# Patient Record
Sex: FEMALE | Race: WHITE | NOT HISPANIC OR LATINO | Employment: OTHER | URBAN - METROPOLITAN AREA
[De-identification: names, ages, dates, MRNs, and addresses within clinical notes are randomized per-mention and may not be internally consistent; named-entity substitution may affect disease eponyms.]

---

## 2020-09-09 LAB — CRC RECOMMENDATION EXT: NORMAL

## 2020-11-03 ENCOUNTER — HISTORICAL (OUTPATIENT)
Dept: ADMINISTRATIVE | Facility: HOSPITAL | Age: 60
End: 2020-11-03

## 2020-11-03 LAB
ABS NEUT (OLG): 3.83 X10(3)/MCL (ref 2.1–9.2)
ALBUMIN SERPL-MCNC: 4.1 GM/DL (ref 3.4–4.8)
ALBUMIN/GLOB SERPL: 1.4 RATIO (ref 1.1–2)
ALP SERPL-CCNC: 183 UNIT/L (ref 40–150)
ALT SERPL-CCNC: 24 UNIT/L (ref 0–55)
AST SERPL-CCNC: 21 UNIT/L (ref 5–34)
BASOPHILS # BLD AUTO: 0.1 X10(3)/MCL (ref 0–0.2)
BASOPHILS NFR BLD AUTO: 1 %
BILIRUB SERPL-MCNC: 0.5 MG/DL
BILIRUBIN DIRECT+TOT PNL SERPL-MCNC: 0.2 MG/DL (ref 0–0.5)
BILIRUBIN DIRECT+TOT PNL SERPL-MCNC: 0.3 MG/DL (ref 0–0.8)
BUN SERPL-MCNC: 8.9 MG/DL (ref 9.8–20.1)
CALCIUM SERPL-MCNC: 9.1 MG/DL (ref 8.4–10.2)
CHLORIDE SERPL-SCNC: 106 MMOL/L (ref 98–107)
CHOLEST SERPL-MCNC: 209 MG/DL
CHOLEST/HDLC SERPL: 3 {RATIO} (ref 0–5)
CO2 SERPL-SCNC: 28 MMOL/L (ref 23–31)
CREAT SERPL-MCNC: 0.83 MG/DL (ref 0.55–1.02)
EOSINOPHIL # BLD AUTO: 0.1 X10(3)/MCL (ref 0–0.9)
EOSINOPHIL NFR BLD AUTO: 2 %
ERYTHROCYTE [DISTWIDTH] IN BLOOD BY AUTOMATED COUNT: 13.3 % (ref 11.5–17)
GLOBULIN SER-MCNC: 3 GM/DL (ref 2.4–3.5)
GLUCOSE SERPL-MCNC: 97 MG/DL (ref 82–115)
HCT VFR BLD AUTO: 42.6 % (ref 37–47)
HDLC SERPL-MCNC: 60 MG/DL (ref 35–60)
HGB BLD-MCNC: 13.3 GM/DL (ref 12–16)
LDLC SERPL CALC-MCNC: 118 MG/DL (ref 50–140)
LYMPHOCYTES # BLD AUTO: 2.7 X10(3)/MCL (ref 0.6–4.6)
LYMPHOCYTES NFR BLD AUTO: 37 %
MCH RBC QN AUTO: 28.8 PG (ref 27–31)
MCHC RBC AUTO-ENTMCNC: 31.2 GM/DL (ref 33–36)
MCV RBC AUTO: 92.2 FL (ref 80–94)
MONOCYTES # BLD AUTO: 0.5 X10(3)/MCL (ref 0.1–1.3)
MONOCYTES NFR BLD AUTO: 7 %
NEUTROPHILS # BLD AUTO: 3.83 X10(3)/MCL (ref 2.1–9.2)
NEUTROPHILS NFR BLD AUTO: 53 %
PLATELET # BLD AUTO: 326 X10(3)/MCL (ref 130–400)
PMV BLD AUTO: 12.3 FL (ref 9.4–12.4)
POTASSIUM SERPL-SCNC: 4.7 MMOL/L (ref 3.5–5.1)
PROT SERPL-MCNC: 7.1 GM/DL (ref 5.8–7.6)
RBC # BLD AUTO: 4.62 X10(6)/MCL (ref 4.2–5.4)
SODIUM SERPL-SCNC: 145 MMOL/L (ref 136–145)
TRIGL SERPL-MCNC: 155 MG/DL (ref 37–140)
VLDLC SERPL CALC-MCNC: 31 MG/DL
WBC # SPEC AUTO: 7.2 X10(3)/MCL (ref 4.5–11.5)

## 2021-09-16 LAB
BUN SERPL-MCNC: 20 MG/DL (ref 7–18)
CHOLEST SERPL-MCNC: 222 MG/DL
CREAT SERPL-MCNC: 0.94 MG/DL (ref 0.6–1.3)
GLUCOSE SERPL-MCNC: 92 MG/DL (ref 74–106)
HDLC SERPL-MCNC: 53 MG/DL (ref 35–60)
LDLC SERPL CALC-MCNC: 135 MG/DL
TRIGL SERPL-MCNC: 189 MG/DL (ref 30–150)

## 2021-09-28 ENCOUNTER — HISTORICAL (OUTPATIENT)
Dept: RADIOLOGY | Facility: HOSPITAL | Age: 61
End: 2021-09-28

## 2022-09-18 ENCOUNTER — HISTORICAL (OUTPATIENT)
Dept: ADMINISTRATIVE | Facility: HOSPITAL | Age: 62
End: 2022-09-18
Payer: COMMERCIAL

## 2022-10-14 ENCOUNTER — DOCUMENTATION ONLY (OUTPATIENT)
Dept: ADMINISTRATIVE | Facility: HOSPITAL | Age: 62
End: 2022-10-14
Payer: COMMERCIAL

## 2022-10-14 NOTE — PROGRESS NOTES
Population Health Outreach. Records Received. Hyperlinked into chart at this time. The following record(s) below were uploaded for Health Maintenance.       Colonoscopy 9.9.2020

## 2023-03-03 ENCOUNTER — DOCUMENTATION ONLY (OUTPATIENT)
Dept: PRIMARY CARE CLINIC | Facility: CLINIC | Age: 63
End: 2023-03-03

## 2023-03-03 ENCOUNTER — OFFICE VISIT (OUTPATIENT)
Dept: PRIMARY CARE CLINIC | Facility: CLINIC | Age: 63
End: 2023-03-03
Payer: COMMERCIAL

## 2023-03-03 VITALS
OXYGEN SATURATION: 98 % | WEIGHT: 156.19 LBS | SYSTOLIC BLOOD PRESSURE: 138 MMHG | DIASTOLIC BLOOD PRESSURE: 82 MMHG | HEART RATE: 70 BPM

## 2023-03-03 DIAGNOSIS — K50.90 CROHN'S DISEASE WITHOUT COMPLICATION, UNSPECIFIED GASTROINTESTINAL TRACT LOCATION: ICD-10-CM

## 2023-03-03 DIAGNOSIS — R73.09 ELEVATED GLUCOSE: ICD-10-CM

## 2023-03-03 DIAGNOSIS — E87.5 HYPERKALEMIA: ICD-10-CM

## 2023-03-03 DIAGNOSIS — Z00.00 WELLNESS EXAMINATION: ICD-10-CM

## 2023-03-03 DIAGNOSIS — Z12.39 ENCOUNTER FOR SCREENING FOR MALIGNANT NEOPLASM OF BREAST, UNSPECIFIED SCREENING MODALITY: Primary | ICD-10-CM

## 2023-03-03 PROCEDURE — 99396 PR PREVENTIVE VISIT,EST,40-64: ICD-10-PCS | Mod: ,,, | Performed by: FAMILY MEDICINE

## 2023-03-03 PROCEDURE — 1159F PR MEDICATION LIST DOCUMENTED IN MEDICAL RECORD: ICD-10-PCS | Mod: CPTII,,, | Performed by: FAMILY MEDICINE

## 2023-03-03 PROCEDURE — 1160F PR REVIEW ALL MEDS BY PRESCRIBER/CLIN PHARMACIST DOCUMENTED: ICD-10-PCS | Mod: CPTII,,, | Performed by: FAMILY MEDICINE

## 2023-03-03 PROCEDURE — 1159F MED LIST DOCD IN RCRD: CPT | Mod: CPTII,,, | Performed by: FAMILY MEDICINE

## 2023-03-03 PROCEDURE — 3079F DIAST BP 80-89 MM HG: CPT | Mod: CPTII,,, | Performed by: FAMILY MEDICINE

## 2023-03-03 PROCEDURE — 99396 PREV VISIT EST AGE 40-64: CPT | Mod: ,,, | Performed by: FAMILY MEDICINE

## 2023-03-03 PROCEDURE — 3075F SYST BP GE 130 - 139MM HG: CPT | Mod: CPTII,,, | Performed by: FAMILY MEDICINE

## 2023-03-03 PROCEDURE — 1160F RVW MEDS BY RX/DR IN RCRD: CPT | Mod: CPTII,,, | Performed by: FAMILY MEDICINE

## 2023-03-03 PROCEDURE — 3079F PR MOST RECENT DIASTOLIC BLOOD PRESSURE 80-89 MM HG: ICD-10-PCS | Mod: CPTII,,, | Performed by: FAMILY MEDICINE

## 2023-03-03 PROCEDURE — 3075F PR MOST RECENT SYSTOLIC BLOOD PRESS GE 130-139MM HG: ICD-10-PCS | Mod: CPTII,,, | Performed by: FAMILY MEDICINE

## 2023-03-03 RX ORDER — PANTOPRAZOLE SODIUM 40 MG/1
40 TABLET, DELAYED RELEASE ORAL
COMMUNITY
Start: 2023-02-07

## 2023-03-03 RX ORDER — MESALAMINE 400 MG/1
CAPSULE, DELAYED RELEASE ORAL
COMMUNITY
Start: 2022-10-27

## 2023-03-03 RX ORDER — FUROSEMIDE 40 MG/1
40 TABLET ORAL DAILY
Qty: 7 TABLET | Refills: 0 | Status: SHIPPED | OUTPATIENT
Start: 2023-03-03 | End: 2023-03-10

## 2023-03-03 NOTE — PROGRESS NOTES
Chief Complaint  Chief Complaint   Patient presents with    Annual Exam       History of Present Illness  Patient presents for routine annual wellness visit.  Lab work performed prior to this visit includes FLP with LDL of 130 with CBC showing no concerning findings and CMP with elevated potassium at 5.8 with no other abnormalities other than glucose of 101.  Patient denies any signs or symptoms that are concerning at this time and blood pressure within normal limits upon manual recheck.    PMH:  No past medical history on file.     Procedure/Surgical History  Past Surgical History:   Procedure Laterality Date    COLONOSCOPY  09/09/2020    HARSH WAITE MD       Medications  Current Outpatient Medications on File Prior to Visit   Medication Sig Dispense Refill    busPIRone (BUSPAR) 5 MG Tab TAKE 1 TABLET BY MOUTH EVERY MORNING 90 tablet 3    mesalamine (DELZICOL) 400 mg cdti cdti capsule Take by mouth.      metoprolol succinate (TOPROL-XL) 100 MG 24 hr tablet TAKE 1 TABLET BY MOUTH DAILY 90 tablet 3    pantoprazole (PROTONIX) 40 MG tablet Take 40 mg by mouth.       No current facility-administered medications on file prior to visit.       Specialist:  Patient Care Team:  Rory Anderson MD as PCP - General (Family Medicine)     Screening:  Health Maintenance Due   Topic Date Due    Hepatitis C Screening  Never done    COVID-19 Vaccine (1) Never done    HIV Screening  Never done    TETANUS VACCINE  Never done    Cervical Cancer Screening  Never done    Shingles Vaccine (1 of 2) Never done    Influenza Vaccine (1) Never done    Mammogram  09/28/2022        Allegies  Review of patient's allergies indicates:  No Known Allergies     Social History  Social History     Socioeconomic History    Marital status:    Tobacco Use    Smoking status: Every Day     Types: Cigarettes    Smokeless tobacco: Never   Substance and Sexual Activity    Alcohol use: Yes    Drug use: Never    Sexual activity: Yes       Family  History  History reviewed. No pertinent family history.    Review of Systems  Review of Systems   Constitutional:  Negative for chills and fever.   HENT:  Negative for congestion and sore throat.    Eyes:  Negative for redness and visual disturbance.   Respiratory:  Negative for cough and shortness of breath.    Cardiovascular:  Negative for chest pain and palpitations.   Gastrointestinal:  Negative for nausea and vomiting.   Musculoskeletal:  Negative for arthralgias and myalgias.   Skin:  Negative for pallor and rash.   Neurological:  Negative for dizziness and headaches.   Psychiatric/Behavioral:  Negative for agitation and dysphoric mood.      Physical Exam  Physical Exam  Constitutional:       Appearance: Normal appearance.   HENT:      Head: Normocephalic and atraumatic.   Eyes:      General: No scleral icterus.     Conjunctiva/sclera: Conjunctivae normal.   Cardiovascular:      Rate and Rhythm: Normal rate and regular rhythm.   Pulmonary:      Effort: Pulmonary effort is normal.      Breath sounds: Normal breath sounds.   Skin:     General: Skin is warm and dry.   Neurological:      General: No focal deficit present.      Mental Status: She is alert and oriented to person, place, and time.   Psychiatric:         Mood and Affect: Mood normal.         Behavior: Behavior normal.       Immunizations    There is no immunization history on file for this patient.    Health Maintenance  Health Maintenance   Topic Date Due    Hepatitis C Screening  Never done    TETANUS VACCINE  Never done    Mammogram  09/28/2022    Lipid Panel  02/22/2027        Assessment     ICD-10-CM ICD-9-CM   1. Encounter for screening for malignant neoplasm of breast, unspecified screening modality  Z12.39 V76.10   2. Wellness examination  Z00.00 V70.0   3. Elevated glucose  R73.09 790.29   4. Hyperkalemia  E87.5 276.7   5. Crohn's disease without complication, unspecified gastrointestinal tract location  K50.90 555.9        Plan  Problem List  Items Addressed This Visit          Renal/    Hyperkalemia    Overview     Decrease potassium intake in diet and adequate hydration recommended with 1 week course of Lasix as potassium wasting diuretic monitor for any signs or symptoms concerning for symptomatic hyperkalemia.  Discussed possibility of lab error as well however we will treat empirically and consider retesting at earlier date if concerning symptoms arise.            GI    Crohn's disease without complication, unspecified gastrointestinal tract location    Overview     Continue to follow-up with gastroenterology for routine monitoring management            Other    Wellness examination    Overview     Discussed routine annual health maintenance and screening in addition to acute issues noted below.          Other Visit Diagnoses       Encounter for screening for malignant neoplasm of breast, unspecified screening modality    -  Primary    Elevated glucose                Follow up in about 1 year (around 3/3/2024) for wellness, lab review.    Rory Anderson

## 2023-04-03 ENCOUNTER — HOSPITAL ENCOUNTER (OUTPATIENT)
Dept: RADIOLOGY | Facility: HOSPITAL | Age: 63
Discharge: HOME | End: 2023-04-03
Attending: FAMILY MEDICINE
Payer: COMMERCIAL

## 2023-04-03 DIAGNOSIS — Z12.39 ENCOUNTER FOR SCREENING FOR MALIGNANT NEOPLASM OF BREAST, UNSPECIFIED SCREENING MODALITY: ICD-10-CM

## 2023-04-03 PROCEDURE — 77067 SCR MAMMO BI INCL CAD: CPT | Mod: TC

## 2023-04-03 PROCEDURE — 77063 MAMMO DIGITAL SCREENING BILAT WITH TOMO: ICD-10-PCS | Mod: 26,,, | Performed by: RADIOLOGY

## 2023-04-03 PROCEDURE — 77067 MAMMO DIGITAL SCREENING BILAT WITH TOMO: ICD-10-PCS | Mod: 26,,, | Performed by: RADIOLOGY

## 2023-04-03 PROCEDURE — 77063 BREAST TOMOSYNTHESIS BI: CPT | Mod: 26,,, | Performed by: RADIOLOGY

## 2023-04-03 PROCEDURE — 77067 SCR MAMMO BI INCL CAD: CPT | Mod: 26,,, | Performed by: RADIOLOGY

## 2023-04-06 ENCOUNTER — TELEPHONE (OUTPATIENT)
Dept: PRIMARY CARE CLINIC | Facility: CLINIC | Age: 63
End: 2023-04-06
Payer: COMMERCIAL

## 2023-04-06 NOTE — TELEPHONE ENCOUNTER
----- Message from Rory Anderson MD sent at 4/5/2023  6:24 AM CDT -----  Normal MMG. Repeat in 1 year.

## 2023-06-05 PROBLEM — Z00.00 WELLNESS EXAMINATION: Status: RESOLVED | Noted: 2023-03-03 | Resolved: 2023-06-05

## 2023-12-12 DIAGNOSIS — I10 HYPERTENSION, UNSPECIFIED TYPE: ICD-10-CM

## 2023-12-12 RX ORDER — METOPROLOL SUCCINATE 100 MG/1
100 TABLET, EXTENDED RELEASE ORAL DAILY
Qty: 90 TABLET | Refills: 3 | Status: SHIPPED | OUTPATIENT
Start: 2023-12-12

## 2024-01-16 DIAGNOSIS — F41.9 ANXIETY: ICD-10-CM

## 2024-01-16 RX ORDER — BUSPIRONE HYDROCHLORIDE 5 MG/1
5 TABLET ORAL EVERY MORNING
Qty: 90 TABLET | Refills: 3 | Status: SHIPPED | OUTPATIENT
Start: 2024-01-16

## 2024-04-10 ENCOUNTER — TELEPHONE (OUTPATIENT)
Dept: PRIMARY CARE CLINIC | Facility: CLINIC | Age: 64
End: 2024-04-10
Payer: COMMERCIAL

## 2024-04-10 ENCOUNTER — OFFICE VISIT (OUTPATIENT)
Dept: PRIMARY CARE CLINIC | Facility: CLINIC | Age: 64
End: 2024-04-10
Payer: COMMERCIAL

## 2024-04-10 VITALS
SYSTOLIC BLOOD PRESSURE: 156 MMHG | OXYGEN SATURATION: 97 % | DIASTOLIC BLOOD PRESSURE: 80 MMHG | HEIGHT: 62 IN | BODY MASS INDEX: 29.78 KG/M2 | WEIGHT: 161.81 LBS | HEART RATE: 77 BPM

## 2024-04-10 DIAGNOSIS — I10 BENIGN ESSENTIAL HTN: ICD-10-CM

## 2024-04-10 DIAGNOSIS — B02.9 HERPES ZOSTER WITHOUT COMPLICATION: Primary | ICD-10-CM

## 2024-04-10 DIAGNOSIS — Z00.00 WELLNESS EXAMINATION: ICD-10-CM

## 2024-04-10 DIAGNOSIS — K50.90 CROHN'S DISEASE WITHOUT COMPLICATION, UNSPECIFIED GASTROINTESTINAL TRACT LOCATION: ICD-10-CM

## 2024-04-10 PROCEDURE — 3008F BODY MASS INDEX DOCD: CPT | Mod: CPTII,,, | Performed by: STUDENT IN AN ORGANIZED HEALTH CARE EDUCATION/TRAINING PROGRAM

## 2024-04-10 PROCEDURE — 1101F PT FALLS ASSESS-DOCD LE1/YR: CPT | Mod: CPTII,,, | Performed by: STUDENT IN AN ORGANIZED HEALTH CARE EDUCATION/TRAINING PROGRAM

## 2024-04-10 PROCEDURE — 99214 OFFICE O/P EST MOD 30 MIN: CPT | Mod: ,,, | Performed by: STUDENT IN AN ORGANIZED HEALTH CARE EDUCATION/TRAINING PROGRAM

## 2024-04-10 PROCEDURE — 1126F AMNT PAIN NOTED NONE PRSNT: CPT | Mod: CPTII,,, | Performed by: STUDENT IN AN ORGANIZED HEALTH CARE EDUCATION/TRAINING PROGRAM

## 2024-04-10 PROCEDURE — 1160F RVW MEDS BY RX/DR IN RCRD: CPT | Mod: CPTII,,, | Performed by: STUDENT IN AN ORGANIZED HEALTH CARE EDUCATION/TRAINING PROGRAM

## 2024-04-10 PROCEDURE — 3079F DIAST BP 80-89 MM HG: CPT | Mod: CPTII,,, | Performed by: STUDENT IN AN ORGANIZED HEALTH CARE EDUCATION/TRAINING PROGRAM

## 2024-04-10 PROCEDURE — 3077F SYST BP >= 140 MM HG: CPT | Mod: CPTII,,, | Performed by: STUDENT IN AN ORGANIZED HEALTH CARE EDUCATION/TRAINING PROGRAM

## 2024-04-10 PROCEDURE — 1159F MED LIST DOCD IN RCRD: CPT | Mod: CPTII,,, | Performed by: STUDENT IN AN ORGANIZED HEALTH CARE EDUCATION/TRAINING PROGRAM

## 2024-04-10 PROCEDURE — 3288F FALL RISK ASSESSMENT DOCD: CPT | Mod: CPTII,,, | Performed by: STUDENT IN AN ORGANIZED HEALTH CARE EDUCATION/TRAINING PROGRAM

## 2024-04-10 RX ORDER — VALACYCLOVIR HYDROCHLORIDE 1 G/1
1000 TABLET, FILM COATED ORAL 3 TIMES DAILY
Qty: 21 TABLET | Refills: 0 | Status: SHIPPED | OUTPATIENT
Start: 2024-04-10 | End: 2024-04-17

## 2024-04-10 RX ORDER — LISINOPRIL 10 MG/1
10 TABLET ORAL DAILY
Qty: 90 TABLET | Refills: 3 | Status: SHIPPED | OUTPATIENT
Start: 2024-04-10 | End: 2025-04-10

## 2024-04-10 NOTE — ASSESSMENT & PLAN NOTE
Start Valacyclovir 1000 mg q.8 hours x7 days.  Adverse effects discussed (including but not limited to delirium, psychosis, encephalopathy, seizures, GI upset, photosensitivity, decreased platelets, abnormal liver function)  Lidocaine ointment and cortisone cream prn  Contact precautions   ED precautions for worsening symptoms   Shingles vaccine 3 months after clearance if not done already

## 2024-04-10 NOTE — ASSESSMENT & PLAN NOTE
Blood pressure not at goal <140/90 (<130/80 if otherwise noted)  Start Lisinopril 10mg qd. AE discussed  Continue Metoprolol 100mg qd  Record BP at home and call if still greater than 140/90.  Nurse visit in 1 week for BP check  Recommend DASH diet  Avoid tobacco use  Record BP at home daily and bring log to next office visit, assure that home cuff is calibrated at minimum every 12 months

## 2024-04-10 NOTE — PROGRESS NOTES
"Date: 04/10/2024  Patient ID: [id number]   Chief Complaint: Herpes Zoster (Possible shingles, under arm of right side under bra strap)    HPI:   Jacki Rutledge is a 63 y.o. female here today for Herpes Zoster (Possible shingles, under arm of right side under bra strap)  Started 2 d ago with itching on R side and developed 1 "bump" last night that turned into a cluster of "bumps that is painful at times." Did have increased stress for the past 2 wks and is on Mesalamine for Crohns. Did not have Shingles vaccine yet  Also has hair loss, cold intolerance, increased weight, sleeplessness-would like to check TFTs. BP at home 140-150s    Patient Active Problem List   Diagnosis    Hyperkalemia    Crohn's disease without complication, unspecified gastrointestinal tract location    Herpes zoster without complication    Benign essential HTN     Outpatient Medications Marked as Taking for the 4/10/24 encounter (Office Visit) with Darlene Vazquez MD   Medication Sig Dispense Refill    busPIRone (BUSPAR) 5 MG Tab Take 1 tablet (5 mg total) by mouth every morning. 90 tablet 3    mesalamine (DELZICOL) 400 mg cdti cdti capsule Take by mouth.      metoprolol succinate (TOPROL-XL) 100 MG 24 hr tablet Take 1 tablet (100 mg total) by mouth once daily. 90 tablet 3    pantoprazole (PROTONIX) 40 MG tablet Take 40 mg by mouth.       History reviewed. No pertinent past medical history.  Past Surgical History:   Procedure Laterality Date    COLONOSCOPY  09/09/2020    HARSH WAITE MD     Review of patient's allergies indicates:  No Known Allergies  History reviewed. No pertinent family history.   Social History     Socioeconomic History    Marital status:    Tobacco Use    Smoking status: Every Day     Types: Cigarettes    Smokeless tobacco: Never   Substance and Sexual Activity    Alcohol use: Yes    Drug use: Never    Sexual activity: Yes     Social Determinants of Health     Financial Resource Strain: Low Risk  (4/10/2024)    " "Overall Financial Resource Strain (CARDIA)     Difficulty of Paying Living Expenses: Not hard at all   Food Insecurity: No Food Insecurity (4/10/2024)    Hunger Vital Sign     Worried About Running Out of Food in the Last Year: Never true     Ran Out of Food in the Last Year: Never true   Transportation Needs: No Transportation Needs (4/10/2024)    PRAPARE - Transportation     Lack of Transportation (Medical): No     Lack of Transportation (Non-Medical): No   Stress: Stress Concern Present (4/10/2024)    [employer] [place] [employer] - Occupational Stress Questionnaire     Feeling of Stress : Rather much   Social Connections: Unknown (4/10/2024)    Social Connection and Isolation Panel [NHANES]     Frequency of Communication with Friends and Family: More than three times a week     Frequency of Social Gatherings with Friends and Family: Patient declined     Active Member of Clubs or Organizations: No     Attends Club or Organization Meetings: Never     Marital Status:    Housing Stability: Unknown (4/10/2024)    Housing Stability Vital Sign     Unable to Pay for Housing in the Last Year: No     Unstable Housing in the Last Year: No     Patient Care Team:  Darlene Vazquez MD as PCP - General (Family Medicine)   Subjective:   ROS  See HPI for details  All Other ROS: Negative except as stated in HPI.   Objective:   BP (!) 156/80   Pulse 77   Ht 5' 2" (1.575 m)   Wt 73.4 kg (161 lb 12.8 oz)   SpO2 97%   BMI 29.59 kg/m²   Physical Exam  Constitutional:       General: She is in acute distress.   Cardiovascular:      Rate and Rhythm: Normal rate and regular rhythm.      Heart sounds: Normal heart sounds.   Skin:     Comments: Cluster of erythematous nodules on skin along R chest/bra line without bleeding/blister/discharge   Neurological:      Mental Status: She is alert and oriented to person, place, and time.       Assessment:       ICD-10-CM ICD-9-CM   1. Herpes zoster without complication  B02.9 " 053.9   2. Crohn's disease without complication, unspecified gastrointestinal tract location  K50.90 555.9   3. Wellness examination  Z00.00 V70.0   4. Benign essential HTN  I10 401.1      Plan:   1. Herpes zoster without complication  Assessment & Plan:  Start Valacyclovir 1000 mg q.8 hours x7 days.  Adverse effects discussed (including but not limited to delirium, psychosis, encephalopathy, seizures, GI upset, photosensitivity, decreased platelets, abnormal liver function)  Lidocaine ointment and cortisone cream prn  Contact precautions   ED precautions for worsening symptoms   Shingles vaccine 3 months after clearance if not done already      Orders:  -     valACYclovir (VALTREX) 1000 MG tablet; Take 1 tablet (1,000 mg total) by mouth 3 (three) times daily. for 7 days  Dispense: 21 tablet; Refill: 0  -     diclofenac 0.15% LIDOcaine 2.25% prilocaine 2.25% topical cream; Apply topically 2 (two) times daily as needed (pain). Apply 1 or 2 pumps to painful area twice daily.  Maximum 10 pumps/day.  Dispense: 480 g; Refill: 0    2. Crohn's disease without complication, unspecified gastrointestinal tract location  Overview:  Continue to follow-up with gastroenterology for routine monitoring management      3. Wellness examination  -     CBC Auto Differential; Future; Expected date: 04/10/2024  -     Comprehensive Metabolic Panel; Future; Expected date: 04/10/2024  -     Lipid Panel; Future; Expected date: 04/10/2024  -     TSH; Future; Expected date: 04/10/2024  -     Hemoglobin A1C; Future; Expected date: 04/10/2024  -     Urinalysis; Future; Expected date: 04/10/2024  -     T4, Free; Future; Expected date: 04/10/2024  -     Vitamin D; Future; Expected date: 04/10/2024    4. Benign essential HTN  Assessment & Plan:  Blood pressure not at goal <140/90 (<130/80 if otherwise noted)  Start Lisinopril 10mg qd. AE discussed  Continue Metoprolol 100mg qd  Record BP at home and call if still greater than 140/90.  Nurse visit  in 1 week for BP check  Recommend DASH diet  Avoid tobacco use  Record BP at home daily and bring log to next office visit, assure that home cuff is calibrated at minimum every 12 months      Orders:  -     lisinopriL 10 MG tablet; Take 1 tablet (10 mg total) by mouth once daily.  Dispense: 90 tablet; Refill: 3         Medication List with Changes/Refills   New Medications    DICLOFENAC 0.15% LIDOCAINE 2.25% PRILOCAINE 2.25% TOPICAL CREAM    Apply topically 2 (two) times daily as needed (pain). Apply 1 or 2 pumps to painful area twice daily.  Maximum 10 pumps/day.       Start Date: 4/10/2024 End Date: 4/10/2025    LISINOPRIL 10 MG TABLET    Take 1 tablet (10 mg total) by mouth once daily.       Start Date: 4/10/2024 End Date: 4/10/2025    VALACYCLOVIR (VALTREX) 1000 MG TABLET    Take 1 tablet (1,000 mg total) by mouth 3 (three) times daily. for 7 days       Start Date: 4/10/2024 End Date: 4/17/2024   Current Medications    BUSPIRONE (BUSPAR) 5 MG TAB    Take 1 tablet (5 mg total) by mouth every morning.       Start Date: 1/16/2024 End Date: --    MESALAMINE (DELZICOL) 400 MG CDTI CDTI CAPSULE    Take by mouth.       Start Date: 10/27/2022End Date: --    METOPROLOL SUCCINATE (TOPROL-XL) 100 MG 24 HR TABLET    Take 1 tablet (100 mg total) by mouth once daily.       Start Date: 12/12/2023End Date: --    PANTOPRAZOLE (PROTONIX) 40 MG TABLET    Take 40 mg by mouth.       Start Date: 2/7/2023  End Date: --   Discontinued Medications    FUROSEMIDE (LASIX) 40 MG TABLET    Take 1 tablet (40 mg total) by mouth once daily. for 7 days       Start Date: 3/3/2023  End Date: 4/10/2024        Follow up in about 2 weeks (around 4/24/2024) for keep next appt, Wellness. In addition to their scheduled follow up, the patient has also been instructed to follow up on as needed basis.

## 2024-04-10 NOTE — TELEPHONE ENCOUNTER
----- Message from Darlene Vazquez MD sent at 4/10/2024 12:24 PM CDT -----  If possible set up appt or telemed appt or pt can go to urgent care  ----- Message -----  From: Meredith Livingston LPN  Sent: 4/10/2024   8:21 AM CDT  To: Darlene Vazquez MD    Please advise  ----- Message -----  From: Kate Tse  Sent: 4/10/2024   8:11 AM CDT  To: [facility] Staff    .Type:  Needs Medical Advice    Who Called: pt  Symptoms (please be specific):    How long has patient had these symptoms:    Pharmacy name and phone #:    Would the patient rather a call back or a response via [facility]?   Best Call Back Number: [phone]  Additional Information: pt said she woke up today looking like she has shingles

## 2024-04-18 ENCOUNTER — CLINICAL SUPPORT (OUTPATIENT)
Dept: PRIMARY CARE CLINIC | Facility: CLINIC | Age: 64
End: 2024-04-18
Payer: COMMERCIAL

## 2024-04-18 DIAGNOSIS — Z00.00 WELLNESS EXAMINATION: Primary | ICD-10-CM

## 2024-04-18 DIAGNOSIS — Z00.00 WELLNESS EXAMINATION: ICD-10-CM

## 2024-04-18 LAB
ALBUMIN SERPL-MCNC: 3.7 G/DL (ref 3.4–4.8)
ALBUMIN/GLOB SERPL: 1.3 RATIO (ref 1.1–2)
ALP SERPL-CCNC: 166 UNIT/L (ref 40–150)
ALT SERPL-CCNC: 32 UNIT/L (ref 0–55)
APPEARANCE UR: CLEAR
AST SERPL-CCNC: 30 UNIT/L (ref 5–34)
BACTERIA #/AREA URNS AUTO: NORMAL /HPF
BASOPHILS # BLD AUTO: 0.05 X10(3)/MCL
BASOPHILS NFR BLD AUTO: 0.7 %
BILIRUB SERPL-MCNC: 0.4 MG/DL
BILIRUB UR QL STRIP.AUTO: NEGATIVE
BUN SERPL-MCNC: 14.2 MG/DL (ref 9.8–20.1)
CALCIUM SERPL-MCNC: 9.7 MG/DL (ref 8.4–10.2)
CHLORIDE SERPL-SCNC: 108 MMOL/L (ref 98–107)
CHOLEST SERPL-MCNC: 216 MG/DL
CHOLEST/HDLC SERPL: 4 {RATIO} (ref 0–5)
CO2 SERPL-SCNC: 26 MMOL/L (ref 23–31)
COLOR UR AUTO: COLORLESS
CREAT SERPL-MCNC: 0.8 MG/DL (ref 0.55–1.02)
DEPRECATED CALCIDIOL+CALCIFEROL SERPL-MC: 17.8 NG/ML (ref 30–80)
EOSINOPHIL # BLD AUTO: 0.14 X10(3)/MCL (ref 0–0.9)
EOSINOPHIL NFR BLD AUTO: 1.8 %
ERYTHROCYTE [DISTWIDTH] IN BLOOD BY AUTOMATED COUNT: 14 % (ref 11.5–17)
EST. AVERAGE GLUCOSE BLD GHB EST-MCNC: 108.3 MG/DL
GFR SERPLBLD CREATININE-BSD FMLA CKD-EPI: >60 MLS/MIN/1.73/M2
GLOBULIN SER-MCNC: 2.9 GM/DL (ref 2.4–3.5)
GLUCOSE SERPL-MCNC: 94 MG/DL (ref 82–115)
GLUCOSE UR QL STRIP.AUTO: NORMAL
HBA1C MFR BLD: 5.4 %
HCT VFR BLD AUTO: 40.5 % (ref 37–47)
HDLC SERPL-MCNC: 53 MG/DL (ref 35–60)
HGB BLD-MCNC: 13.6 G/DL (ref 12–16)
IMM GRANULOCYTES # BLD AUTO: 0.02 X10(3)/MCL (ref 0–0.04)
IMM GRANULOCYTES NFR BLD AUTO: 0.3 %
KETONES UR QL STRIP.AUTO: NEGATIVE
LDLC SERPL CALC-MCNC: 129 MG/DL (ref 50–140)
LEUKOCYTE ESTERASE UR QL STRIP.AUTO: NEGATIVE
LYMPHOCYTES # BLD AUTO: 2.85 X10(3)/MCL (ref 0.6–4.6)
LYMPHOCYTES NFR BLD AUTO: 37.5 %
MCH RBC QN AUTO: 29.7 PG (ref 27–31)
MCHC RBC AUTO-ENTMCNC: 33.6 G/DL (ref 33–36)
MCV RBC AUTO: 88.4 FL (ref 80–94)
MONOCYTES # BLD AUTO: 0.64 X10(3)/MCL (ref 0.1–1.3)
MONOCYTES NFR BLD AUTO: 8.4 %
NEUTROPHILS # BLD AUTO: 3.9 X10(3)/MCL (ref 2.1–9.2)
NEUTROPHILS NFR BLD AUTO: 51.3 %
NITRITE UR QL STRIP.AUTO: NEGATIVE
NRBC BLD AUTO-RTO: 0 %
PH UR STRIP.AUTO: 5.5 [PH]
PLATELET # BLD AUTO: 293 X10(3)/MCL (ref 130–400)
PMV BLD AUTO: 12.3 FL (ref 7.4–10.4)
POTASSIUM SERPL-SCNC: 4.8 MMOL/L (ref 3.5–5.1)
PROT SERPL-MCNC: 6.6 GM/DL (ref 5.8–7.6)
PROT UR QL STRIP.AUTO: NEGATIVE
RBC # BLD AUTO: 4.58 X10(6)/MCL (ref 4.2–5.4)
RBC #/AREA URNS AUTO: NORMAL /HPF
RBC UR QL AUTO: NEGATIVE
SODIUM SERPL-SCNC: 140 MMOL/L (ref 136–145)
SP GR UR STRIP.AUTO: 1.01 (ref 1–1.03)
SQUAMOUS #/AREA URNS LPF: NORMAL /HPF
T4 FREE SERPL-MCNC: 1.03 NG/DL (ref 0.7–1.48)
TRIGL SERPL-MCNC: 172 MG/DL (ref 37–140)
TSH SERPL-ACNC: 2.8 UIU/ML (ref 0.35–4.94)
UROBILINOGEN UR STRIP-ACNC: NORMAL
VLDLC SERPL CALC-MCNC: 34 MG/DL
WBC # SPEC AUTO: 7.6 X10(3)/MCL (ref 4.5–11.5)
WBC #/AREA URNS AUTO: NORMAL /HPF

## 2024-04-18 PROCEDURE — 82306 VITAMIN D 25 HYDROXY: CPT | Performed by: STUDENT IN AN ORGANIZED HEALTH CARE EDUCATION/TRAINING PROGRAM

## 2024-04-18 PROCEDURE — 80061 LIPID PANEL: CPT | Performed by: STUDENT IN AN ORGANIZED HEALTH CARE EDUCATION/TRAINING PROGRAM

## 2024-04-18 PROCEDURE — 84439 ASSAY OF FREE THYROXINE: CPT | Performed by: STUDENT IN AN ORGANIZED HEALTH CARE EDUCATION/TRAINING PROGRAM

## 2024-04-18 PROCEDURE — 81001 URINALYSIS AUTO W/SCOPE: CPT | Performed by: STUDENT IN AN ORGANIZED HEALTH CARE EDUCATION/TRAINING PROGRAM

## 2024-04-18 PROCEDURE — 84443 ASSAY THYROID STIM HORMONE: CPT | Performed by: STUDENT IN AN ORGANIZED HEALTH CARE EDUCATION/TRAINING PROGRAM

## 2024-04-18 PROCEDURE — 83036 HEMOGLOBIN GLYCOSYLATED A1C: CPT | Performed by: STUDENT IN AN ORGANIZED HEALTH CARE EDUCATION/TRAINING PROGRAM

## 2024-04-18 PROCEDURE — 80053 COMPREHEN METABOLIC PANEL: CPT | Performed by: STUDENT IN AN ORGANIZED HEALTH CARE EDUCATION/TRAINING PROGRAM

## 2024-04-18 PROCEDURE — 85025 COMPLETE CBC W/AUTO DIFF WBC: CPT | Performed by: STUDENT IN AN ORGANIZED HEALTH CARE EDUCATION/TRAINING PROGRAM

## 2024-04-24 ENCOUNTER — OFFICE VISIT (OUTPATIENT)
Dept: PRIMARY CARE CLINIC | Facility: CLINIC | Age: 64
End: 2024-04-24
Payer: COMMERCIAL

## 2024-04-24 VITALS
WEIGHT: 160.81 LBS | HEIGHT: 62 IN | OXYGEN SATURATION: 96 % | SYSTOLIC BLOOD PRESSURE: 139 MMHG | TEMPERATURE: 98 F | BODY MASS INDEX: 29.59 KG/M2 | DIASTOLIC BLOOD PRESSURE: 79 MMHG | HEART RATE: 86 BPM

## 2024-04-24 DIAGNOSIS — J35.8 TONSILLAR MASS: ICD-10-CM

## 2024-04-24 DIAGNOSIS — Z00.00 WELLNESS EXAMINATION: Primary | ICD-10-CM

## 2024-04-24 DIAGNOSIS — I10 BENIGN ESSENTIAL HTN: ICD-10-CM

## 2024-04-24 DIAGNOSIS — F41.9 ANXIETY: ICD-10-CM

## 2024-04-24 DIAGNOSIS — F17.210 CIGARETTE SMOKER: ICD-10-CM

## 2024-04-24 DIAGNOSIS — Z12.31 BREAST CANCER SCREENING BY MAMMOGRAM: ICD-10-CM

## 2024-04-24 DIAGNOSIS — H60.553 ACUTE REACTIVE OTITIS EXTERNA OF BOTH EARS: ICD-10-CM

## 2024-04-24 DIAGNOSIS — Z78.0 POST-MENOPAUSAL: ICD-10-CM

## 2024-04-24 DIAGNOSIS — E55.9 VITAMIN D DEFICIENCY: ICD-10-CM

## 2024-04-24 PROBLEM — H66.93 BILATERAL OTITIS MEDIA: Status: ACTIVE | Noted: 2024-04-24

## 2024-04-24 PROBLEM — E87.5 HYPERKALEMIA: Status: RESOLVED | Noted: 2023-03-03 | Resolved: 2024-04-24

## 2024-04-24 PROCEDURE — 1159F MED LIST DOCD IN RCRD: CPT | Mod: CPTII,,, | Performed by: STUDENT IN AN ORGANIZED HEALTH CARE EDUCATION/TRAINING PROGRAM

## 2024-04-24 PROCEDURE — 99213 OFFICE O/P EST LOW 20 MIN: CPT | Mod: 25,,, | Performed by: STUDENT IN AN ORGANIZED HEALTH CARE EDUCATION/TRAINING PROGRAM

## 2024-04-24 PROCEDURE — 3288F FALL RISK ASSESSMENT DOCD: CPT | Mod: CPTII,,, | Performed by: STUDENT IN AN ORGANIZED HEALTH CARE EDUCATION/TRAINING PROGRAM

## 2024-04-24 PROCEDURE — 4010F ACE/ARB THERAPY RXD/TAKEN: CPT | Mod: CPTII,,, | Performed by: STUDENT IN AN ORGANIZED HEALTH CARE EDUCATION/TRAINING PROGRAM

## 2024-04-24 PROCEDURE — 1101F PT FALLS ASSESS-DOCD LE1/YR: CPT | Mod: CPTII,,, | Performed by: STUDENT IN AN ORGANIZED HEALTH CARE EDUCATION/TRAINING PROGRAM

## 2024-04-24 PROCEDURE — 3075F SYST BP GE 130 - 139MM HG: CPT | Mod: CPTII,,, | Performed by: STUDENT IN AN ORGANIZED HEALTH CARE EDUCATION/TRAINING PROGRAM

## 2024-04-24 PROCEDURE — 3078F DIAST BP <80 MM HG: CPT | Mod: CPTII,,, | Performed by: STUDENT IN AN ORGANIZED HEALTH CARE EDUCATION/TRAINING PROGRAM

## 2024-04-24 PROCEDURE — 3044F HG A1C LEVEL LT 7.0%: CPT | Mod: CPTII,,, | Performed by: STUDENT IN AN ORGANIZED HEALTH CARE EDUCATION/TRAINING PROGRAM

## 2024-04-24 PROCEDURE — 3008F BODY MASS INDEX DOCD: CPT | Mod: CPTII,,, | Performed by: STUDENT IN AN ORGANIZED HEALTH CARE EDUCATION/TRAINING PROGRAM

## 2024-04-24 PROCEDURE — 99397 PER PM REEVAL EST PAT 65+ YR: CPT | Mod: 25,,, | Performed by: STUDENT IN AN ORGANIZED HEALTH CARE EDUCATION/TRAINING PROGRAM

## 2024-04-24 PROCEDURE — 99406 BEHAV CHNG SMOKING 3-10 MIN: CPT | Mod: ,,, | Performed by: STUDENT IN AN ORGANIZED HEALTH CARE EDUCATION/TRAINING PROGRAM

## 2024-04-24 PROCEDURE — 1160F RVW MEDS BY RX/DR IN RCRD: CPT | Mod: CPTII,,, | Performed by: STUDENT IN AN ORGANIZED HEALTH CARE EDUCATION/TRAINING PROGRAM

## 2024-04-24 PROCEDURE — 1126F AMNT PAIN NOTED NONE PRSNT: CPT | Mod: CPTII,,, | Performed by: STUDENT IN AN ORGANIZED HEALTH CARE EDUCATION/TRAINING PROGRAM

## 2024-04-24 RX ORDER — NEOMYCIN SULFATE, POLYMYXIN B SULFATE AND HYDROCORTISONE 10; 3.5; 1 MG/ML; MG/ML; [USP'U]/ML
3 SUSPENSION/ DROPS AURICULAR (OTIC) 2 TIMES DAILY
Qty: 10 ML | Refills: 0 | Status: SHIPPED | OUTPATIENT
Start: 2024-04-24

## 2024-04-24 RX ORDER — HYDROXYZINE PAMOATE 25 MG/1
25 CAPSULE ORAL EVERY 8 HOURS PRN
Qty: 30 CAPSULE | Refills: 11 | Status: SHIPPED | OUTPATIENT
Start: 2024-04-24

## 2024-04-24 NOTE — ASSESSMENT & PLAN NOTE
Shared decision making on the importance of lung cancer screening through LDCT based upon the patient's risk factors which consist of being in the age range of 50-80, having a smoking history of 20 pack years or greater and either continuing to smoke or having not quit smoking within the past 15 years. Patient will be referred for screening imaging studies at this time.  Counseled patient on tobacco cessation and risks of continued tobacco use 3-5min

## 2024-04-24 NOTE — PROGRESS NOTES
Date: 2024  Patient ID: [id number]   Chief Complaint: Annual Exam    HPI:   Jacki Rutledge is a 63 y.o. female here today for a Medicare Wellness.     Opioid Screening: Patient medication list reviewed, patient is not taking prescription opioids. Patient is not using additional opioids than prescribed. Patient is at low risk of substance abuse based on this opioid use history.     Otherwise, patient reports to be doing well. Shingles resolved. Would like to have something at night for racing thoughts. Buspar helps during the day. BP at home 120-130s/60-70s.   Labs wnl except low vitD. Pt started taking supplement OTC    Diet: meat with veggies. Occasional burger  Activity level: walks with treadmill  Cognition: answering questions appropriately and following conversation without issues. No sign of cognitive decline during this exam  MM2023 benign  Pap: pending  DEXA: ordered  CRC: 2020. Rpt in 5yrs  CT chest: ordered    Patient Active Problem List   Diagnosis    Crohn's disease without complication, unspecified gastrointestinal tract location    Herpes zoster without complication    Benign essential HTN    Wellness examination    Anxiety    Cigarette smoker    Tonsillar mass    Acute reactive otitis externa of both ears    Vitamin D deficiency     Current Outpatient Medications   Medication Sig Dispense Refill    busPIRone (BUSPAR) 5 MG Tab Take 1 tablet (5 mg total) by mouth every morning. 90 tablet 3    lisinopriL 10 MG tablet Take 1 tablet (10 mg total) by mouth once daily. 90 tablet 3    mesalamine (DELZICOL) 400 mg cdti cdti capsule Take by mouth.      pantoprazole (PROTONIX) 40 MG tablet Take 40 mg by mouth.      diclofenac 0.15% LIDOcaine 2.25% prilocaine 2.25% topical cream Apply topically 2 (two) times daily as needed (pain). Apply 1 or 2 pumps to painful area twice daily.  Maximum 10 pumps/day. (Patient not taking: Reported on 2024) 480 g 0    hydrOXYzine pamoate (VISTARIL) 25 MG Cap Take 1  capsule (25 mg total) by mouth every 8 (eight) hours as needed (anxiety). 30 capsule 11    metoprolol succinate (TOPROL-XL) 100 MG 24 hr tablet Take 1 tablet (100 mg total) by mouth once daily. (Patient not taking: Reported on 4/24/2024) 90 tablet 3    neomycin-polymyxin-hydrocortisone (CORTISPORIN) 3.5-10,000-1 mg/mL-unit/mL-% otic suspension Place 3 drops into the left ear 2 (two) times a day. 10 mL 0    valACYclovir (VALTREX) 1000 MG tablet Take 1 tablet (1,000 mg total) by mouth 3 (three) times daily. for 7 days 21 tablet 0     No current facility-administered medications for this visit.     Past Medical History:   Diagnosis Date    Hyperkalemia 03/03/2023    Decrease potassium intake in diet and adequate hydration recommended with 1 week course of Lasix as potassium wasting diuretic monitor for any signs or symptoms concerning for symptomatic hyperkalemia.  Discussed possibility of lab error as well however we will treat empirically and consider retesting at earlier date if concerning symptoms arise.       Past Surgical History:   Procedure Laterality Date    COLONOSCOPY  09/09/2020    HARSH WAITE MD     Review of patient's allergies indicates:  No Known Allergies  Family History   Problem Relation Name Age of Onset    Cancer Mother Karissa Khan     Heart disease Father Samreen Khan     Cancer Sister Deisy Khan       Social History     Socioeconomic History    Marital status:    Tobacco Use    Smoking status: Every Day     Current packs/day: 0.50     Types: Cigarettes    Smokeless tobacco: Never   Substance and Sexual Activity    Alcohol use: Yes     Alcohol/week: 2.0 standard drinks of alcohol     Types: 2 Glasses of wine per week    Drug use: Never    Sexual activity: Not Currently     Partners: Male     Birth control/protection: Abstinence     Social Determinants of Health     Financial Resource Strain: Low Risk  (4/24/2024)    Overall Financial Resource Strain (CARDIA)     Difficulty of  Paying Living Expenses: Not hard at all   Food Insecurity: No Food Insecurity (4/24/2024)    Hunger Vital Sign     Worried About Running Out of Food in the Last Year: Never true     Ran Out of Food in the Last Year: Never true   Transportation Needs: No Transportation Needs (4/24/2024)    PRAPARE - Transportation     Lack of Transportation (Medical): No     Lack of Transportation (Non-Medical): No   Physical Activity: Inactive (4/24/2024)    Exercise Vital Sign     Days of Exercise per Week: 0 days     Minutes of Exercise per Session: 0 min   Stress: No Stress Concern Present (4/24/2024)    [employer] - Occupational Stress Questionnaire     Feeling of Stress : Not at all   Recent Concern: Stress - Stress Concern Present (4/10/2024)    [employer] - Occupational Stress Questionnaire     Feeling of Stress : Rather much   Social Connections: Moderately Isolated (4/24/2024)    Social Connection and Isolation Panel [NHANES]     Frequency of Communication with Friends and Family: More than three times a week     Frequency of Social Gatherings with Friends and Family: Never     Attends Mu-ism Services: Never     Active Member of Clubs or Organizations: No     Attends Club or Organization Meetings: Never     Marital Status:    Housing Stability: Unknown (4/24/2024)    Housing Stability Vital Sign     Unable to Pay for Housing in the Last Year: No     Homeless in the Last Year: No     Patient Care Team:  Darlene Vazquez MD as PCP - General (Family Medicine)   Subjective:   Review of Systems   Constitutional:  Negative for fever and weight loss.   HENT:  Negative for congestion, hearing loss and sore throat.    Eyes:  Negative for blurred vision and double vision.   Respiratory:  Negative for cough and shortness of breath.    Cardiovascular:  Negative for chest pain and palpitations.   Gastrointestinal:  Negative for abdominal pain and diarrhea.  "  Genitourinary:  Negative for dysuria, frequency, hematuria and urgency.   Musculoskeletal:  Negative for falls.   Skin:  Negative for rash.   Psychiatric/Behavioral:  Negative for depression and memory loss. The patient is not nervous/anxious and does not have insomnia.      See HPI for details  All Other ROS: Negative except as stated in HPI  Patient Reported Health Risk Assessment     Objective:   /79   Pulse 86   Temp 98.1 °F (36.7 °C)   Ht 5' 2" (1.575 m)   Wt 72.9 kg (160 lb 12.8 oz)   SpO2 96%   BMI 29.41 kg/m²   Physical Exam  Vitals reviewed.   Constitutional:       Appearance: Normal appearance.   HENT:      Head: Normocephalic and atraumatic.      Right Ear: Tympanic membrane and external ear normal.      Left Ear: Tympanic membrane and external ear normal.      Ears:      Comments: Erythematous canals bilaterally     Nose: Nose normal. No congestion or rhinorrhea.      Mouth/Throat:      Mouth: Mucous membranes are moist.      Pharynx: Oropharynx is clear.      Comments: Mass noted on bilateral tonsils without bleeding, discharge  Eyes:      General: No scleral icterus.     Extraocular Movements: Extraocular movements intact.      Conjunctiva/sclera: Conjunctivae normal.   Cardiovascular:      Rate and Rhythm: Normal rate and regular rhythm.      Pulses: Normal pulses.      Heart sounds: Normal heart sounds.   Pulmonary:      Effort: Pulmonary effort is normal.      Breath sounds: Normal breath sounds.   Abdominal:      General: Abdomen is flat. Bowel sounds are normal.      Palpations: Abdomen is soft.      Tenderness: There is no abdominal tenderness.   Musculoskeletal:         General: No swelling or deformity. Normal range of motion.      Cervical back: Normal range of motion and neck supple.   Skin:     General: Skin is warm and dry.   Neurological:      Mental Status: She is alert and oriented to person, place, and time.   Psychiatric:         Mood and Affect: Mood normal.         " Behavior: Behavior normal.         Thought Content: Thought content normal.         Judgment: Judgment normal.            No data to display                  4/24/2024     9:30 AM 4/10/2024     3:15 PM   Fall Risk Assessment - Outpatient   Mobility Status Ambulatory Ambulatory   Number of falls 0 0   Identified as fall risk False False              Assessment:       ICD-10-CM ICD-9-CM   1. Wellness examination  Z00.00 V70.0   2. Benign essential HTN  I10 401.1   3. Anxiety  F41.9 300.00   4. Cigarette smoker  F17.210 305.1   5. Tonsillar mass  J35.8 474.8   6. Acute reactive otitis externa of both ears  H60.553 380.22   7. Post-menopausal  Z78.0 V49.81   8. Breast cancer screening by mammogram  Z12.31 V76.12   9. Vitamin D deficiency  E55.9 268.9      Plan:   1. Wellness examination  Assessment & Plan:  Provided Jacki Rutledge with a 5-10 year written screening schedule and personal prevention plan. Recommendations were developed using the USPSTF age appropriate recommendations. Education, counseling, and referrals were provided as needed. After Visit Summary printed and given to patient which includes a list of additional screenings\tests needed.         2. Benign essential HTN  Assessment & Plan:  Stable  Continue current medication regimen: Lisinopril 10mg qd  Blood pressure at goal <140/90 (<130/80 if otherwise noted)  Recommend DASH diet  Avoid tobacco use  Record BP at home daily and bring log to next office visit, assure that home cuff is calibrated at minimum every 12 months        3. Anxiety  Assessment & Plan:  Stable in am   Continue Buspirone 5mg qd  Add Vistaril 25mg qhs prn for insomnia from anxiety    Orders:  -     hydrOXYzine pamoate (VISTARIL) 25 MG Cap; Take 1 capsule (25 mg total) by mouth every 8 (eight) hours as needed (anxiety).  Dispense: 30 capsule; Refill: 11    4. Cigarette smoker  Assessment & Plan:  Shared decision making on the importance of lung cancer screening through LDCT based upon  the patient's risk factors which consist of being in the age range of 50-80, having a smoking history of 20 pack years or greater and either continuing to smoke or having not quit smoking within the past 15 years. Patient will be referred for screening imaging studies at this time.  Counseled patient on tobacco cessation and risks of continued tobacco use 3-5min      Orders:  -     CT Chest Lung Screening Low Dose; Future; Expected date: 04/24/2024    5. Tonsillar mass  Assessment & Plan:  Refer to ENT for evaluation    Orders:  -     Ambulatory referral/consult to ENT; Future; Expected date: 05/01/2024    6. Acute reactive otitis externa of both ears  Assessment & Plan:  Attempt Cortisporin drops b.i.d. times 5-7 days  Call if worsen    Orders:  -     neomycin-polymyxin-hydrocortisone (CORTISPORIN) 3.5-10,000-1 mg/mL-unit/mL-% otic suspension; Place 3 drops into the left ear 2 (two) times a day.  Dispense: 10 mL; Refill: 0    7. Post-menopausal  -     DXA Bone Density Axial Skeleton 1 or more sites; Future; Expected date: 04/24/2024    8. Breast cancer screening by mammogram  -     Mammo Digital Screening Bilat w/ Emanuel; Future; Expected date: 04/24/2024    9. Vitamin D deficiency  Assessment & Plan:  Continue OTC vitamin-D supplement           Medicare Annual Wellness and Personalized Prevention Plan:   Fall Risk + Home Safety + Hearing Impairment + Depression Screen + Opioid and Substance Abuse Screening + Cognitive Impairment Screen + Health Risk Assessment all reviewed.     Health Maintenance Topics with due status: Not Due       Topic Last Completion Date    Colorectal Cancer Screening 09/09/2020    Hemoglobin A1c (Diabetic Prevention Screening) 04/18/2024    Lipid Panel 04/18/2024      The patient's Health Maintenance was reviewed and the following appears to be due at this time:   Health Maintenance Due   Topic Date Due    Hepatitis C Screening  Never done    Pneumococcal Vaccines (Age 65+) (1 of 2 - PCV)  Never done    HIV Screening  Never done    TETANUS VACCINE  Never done    DEXA Scan  Never done    Shingles Vaccine (1 of 2) Never done    RSV Vaccine (Age 60+ and Pregnant patients) (1 - 1-dose 60+ series) Never done    Influenza Vaccine (1) Never done    COVID-19 Vaccine (1 - 2023-24 season) Never done    Mammogram  04/03/2024        A separate E/M code has been provided to evaluate additional concerns addressed during the dedicated Wellness Exam.    Follow up in about 6 months (around 10/24/2024) for Chronic Conditions. In addition to their scheduled follow up, the patient has also been instructed to follow up on as needed basis.

## 2024-04-24 NOTE — ASSESSMENT & PLAN NOTE
Stable  Continue current medication regimen: Lisinopril 10mg qd  Blood pressure at goal <140/90 (<130/80 if otherwise noted)  Recommend DASH diet  Avoid tobacco use  Record BP at home daily and bring log to next office visit, assure that home cuff is calibrated at minimum every 12 months

## 2024-04-24 NOTE — ASSESSMENT & PLAN NOTE
Provided Jacki Rutledge with a 5-10 year written screening schedule and personal prevention plan. Recommendations were developed using the USPSTF age appropriate recommendations. Education, counseling, and referrals were provided as needed. After Visit Summary printed and given to patient which includes a list of additional screenings\tests needed.

## 2024-05-09 ENCOUNTER — TELEPHONE (OUTPATIENT)
Dept: PRIMARY CARE CLINIC | Facility: CLINIC | Age: 64
End: 2024-05-09
Payer: COMMERCIAL

## 2024-05-09 NOTE — TELEPHONE ENCOUNTER
----- Message from Darlene Vazquez MD sent at 5/9/2024  2:22 PM CDT -----  Please inform patient that lung cancer screening was normal.    Thank you,    Dr. GOLDMAN

## 2024-05-13 ENCOUNTER — HOSPITAL ENCOUNTER (OUTPATIENT)
Dept: RADIOLOGY | Facility: HOSPITAL | Age: 64
Discharge: HOME | End: 2024-05-13
Attending: STUDENT IN AN ORGANIZED HEALTH CARE EDUCATION/TRAINING PROGRAM
Payer: COMMERCIAL

## 2024-05-13 DIAGNOSIS — Z78.0 POST-MENOPAUSAL: ICD-10-CM

## 2024-05-13 DIAGNOSIS — Z12.31 BREAST CANCER SCREENING BY MAMMOGRAM: ICD-10-CM

## 2024-05-13 PROCEDURE — 77080 DXA BONE DENSITY AXIAL: CPT | Mod: TC

## 2024-05-13 PROCEDURE — 77063 BREAST TOMOSYNTHESIS BI: CPT | Mod: 26,,, | Performed by: RADIOLOGY

## 2024-05-13 PROCEDURE — 77081 DXA BONE DENSITY APPENDICULR: CPT | Mod: 26,XU,, | Performed by: RADIOLOGY

## 2024-05-13 PROCEDURE — 77067 SCR MAMMO BI INCL CAD: CPT | Mod: 26,,, | Performed by: RADIOLOGY

## 2024-05-13 PROCEDURE — 77080 DXA BONE DENSITY AXIAL: CPT | Mod: 26,,, | Performed by: RADIOLOGY

## 2024-05-13 PROCEDURE — 77081 DXA BONE DENSITY APPENDICULR: CPT | Mod: XU,TC

## 2024-05-13 PROCEDURE — 77063 BREAST TOMOSYNTHESIS BI: CPT | Mod: TC

## 2024-05-16 ENCOUNTER — TELEPHONE (OUTPATIENT)
Dept: PRIMARY CARE CLINIC | Facility: CLINIC | Age: 64
End: 2024-05-16
Payer: COMMERCIAL

## 2024-05-16 NOTE — PROGRESS NOTES
DEXA Results: Please inform patient of DEXA results, which show OSTEOPENIA. This is a mild thinning of the bone. Encourage Calcium 600mg + Vitamin D 800units BID plus weight bearing exercise at least 3 days per week. Repeat in 2 years.       Thank you,    Dr. Vazquez

## 2024-07-29 PROBLEM — Z00.00 WELLNESS EXAMINATION: Status: RESOLVED | Noted: 2024-04-24 | Resolved: 2024-07-29

## 2025-01-08 DIAGNOSIS — F41.9 ANXIETY: ICD-10-CM

## 2025-01-08 RX ORDER — BUSPIRONE HYDROCHLORIDE 5 MG/1
5 TABLET ORAL EVERY MORNING
Qty: 90 TABLET | Refills: 3 | Status: SHIPPED | OUTPATIENT
Start: 2025-01-08

## 2025-03-07 DIAGNOSIS — I10 BENIGN ESSENTIAL HTN: ICD-10-CM

## 2025-03-07 RX ORDER — LISINOPRIL 20 MG/1
20 TABLET ORAL DAILY
Qty: 30 TABLET | Refills: 2 | Status: SHIPPED | OUTPATIENT
Start: 2025-03-07 | End: 2025-06-05

## 2025-05-16 ENCOUNTER — TELEPHONE (OUTPATIENT)
Dept: PRIMARY CARE CLINIC | Facility: CLINIC | Age: 65
End: 2025-05-16
Payer: COMMERCIAL

## 2025-05-16 NOTE — TELEPHONE ENCOUNTER
Copied from CRM #[id number]. Topic: General Inquiry - Patient Advice  >> May 16, 2025  8:16 AM Kate wrote:  .Who Called: Jacki Rutledge          Patient's Preferred Phone Number on File: [phone]   Best Call Back Number, if different:  Additional Information: Hope it's for eye surgery

## 2025-05-30 ENCOUNTER — TELEPHONE (OUTPATIENT)
Dept: PRIMARY CARE CLINIC | Facility: CLINIC | Age: 65
End: 2025-05-30
Payer: COMMERCIAL

## 2025-05-30 NOTE — TELEPHONE ENCOUNTER
Copied from CRM #[id number]. Topic: Appointments - Amb Referral  >> May 30, 2025  8:04 AM Ashley wrote:  Who Called: Jacki Rutledge    What order is the patient requesting: surgical clearance labs   When does the expect the orders to be performed? [facility] [phone] now waiting on orders        Preferred Method of Contact: Phone Call  Patient's Preferred Phone Number on File: [phone]   Best Call Back Number, if different:  Additional Information: order request, please advise, thanks

## 2025-06-04 DIAGNOSIS — I10 BENIGN ESSENTIAL HTN: ICD-10-CM

## 2025-06-04 RX ORDER — LISINOPRIL 20 MG/1
20 TABLET ORAL DAILY
Qty: 30 TABLET | Refills: 3 | Status: SHIPPED | OUTPATIENT
Start: 2025-06-04

## 2025-06-06 ENCOUNTER — APPOINTMENT (OUTPATIENT)
Dept: LAB | Facility: HOSPITAL | Age: 65
End: 2025-06-06
Attending: STUDENT IN AN ORGANIZED HEALTH CARE EDUCATION/TRAINING PROGRAM
Payer: MEDICARE

## 2025-06-06 ENCOUNTER — OFFICE VISIT (OUTPATIENT)
Dept: PRIMARY CARE CLINIC | Facility: CLINIC | Age: 65
End: 2025-06-06
Payer: MEDICARE

## 2025-06-06 VITALS
HEIGHT: 62 IN | OXYGEN SATURATION: 97 % | WEIGHT: 161.13 LBS | DIASTOLIC BLOOD PRESSURE: 78 MMHG | SYSTOLIC BLOOD PRESSURE: 138 MMHG | BODY MASS INDEX: 29.65 KG/M2 | HEART RATE: 76 BPM

## 2025-06-06 DIAGNOSIS — K50.90 CROHN'S DISEASE WITHOUT COMPLICATION, UNSPECIFIED GASTROINTESTINAL TRACT LOCATION: ICD-10-CM

## 2025-06-06 DIAGNOSIS — I10 BENIGN ESSENTIAL HTN: ICD-10-CM

## 2025-06-06 DIAGNOSIS — Z01.818 PREOP EXAMINATION: Primary | ICD-10-CM

## 2025-06-06 PROCEDURE — 93010 ELECTROCARDIOGRAM REPORT: CPT | Mod: ,,, | Performed by: INTERNAL MEDICINE

## 2025-06-07 LAB
OHS QRS DURATION: 72 MS
OHS QTC CALCULATION: 441 MS

## 2025-06-09 ENCOUNTER — RESULTS FOLLOW-UP (OUTPATIENT)
Dept: PRIMARY CARE CLINIC | Facility: CLINIC | Age: 65
End: 2025-06-09

## 2025-06-17 ENCOUNTER — TELEPHONE (OUTPATIENT)
Dept: PRIMARY CARE CLINIC | Facility: CLINIC | Age: 65
End: 2025-06-17
Payer: MEDICARE

## 2025-06-17 NOTE — TELEPHONE ENCOUNTER
Copied from CRM #[id number]. Topic: General Inquiry - Patient Advice  >> Jun 17, 2025  8:03 AM Ashley wrote:  Who Called: Li in regards to Jacki Duhon        Caller is requesting assistance/information from provider's office.        Symptoms (please be specific): NA    How long has patient had these symptoms: NA    List of preferred pharmacies on file (remove unneeded): [unfilled]    If different, enter pharmacy into here including location and phone number: NA      Preferred Method of Contact: Phone Call    Patient's Preferred Phone Number on File: [phone]    Best Call Back Number, if different:**Li [phone] Ext [phone]**    Additional Information: medical advice, Li with [facility] [phone] ext# [phone] or fax [phone],  called with 2nd attempt to request pt surgical notes from recent appt 6/6/25, please advise, thanks

## 2025-06-19 ENCOUNTER — TELEPHONE (OUTPATIENT)
Dept: PRIMARY CARE CLINIC | Facility: CLINIC | Age: 65
End: 2025-06-19
Payer: MEDICARE

## 2025-06-19 DIAGNOSIS — R94.31 ABNORMAL EKG: Primary | ICD-10-CM

## 2025-06-19 NOTE — TELEPHONE ENCOUNTER
----- Message from Nurse Harper sent at 6/19/2025  3:00 PM CDT -----  She would like to see dr murdock  ----- Message -----  From: Darlene Vazquez MD  Sent: 6/19/2025   2:30 PM CDT  To: Meredith Livingston LPN    ----- Message from Darlene Vazquez MD sent at 6/19/2025  2:30 PM CDT -----

## 2025-06-19 NOTE — TELEPHONE ENCOUNTER
"EKG for preop was abnormal. Would prefer cardiology to examine pt prior to being "cleared". Pls ask if she has a preference  "

## 2025-06-20 ENCOUNTER — DOCUMENTATION ONLY (OUTPATIENT)
Dept: PRIMARY CARE CLINIC | Facility: CLINIC | Age: 65
End: 2025-06-20
Payer: MEDICARE

## 2025-07-15 ENCOUNTER — OFFICE VISIT (OUTPATIENT)
Dept: PRIMARY CARE CLINIC | Facility: CLINIC | Age: 65
End: 2025-07-15
Payer: MEDICARE

## 2025-07-15 VITALS
HEART RATE: 79 BPM | OXYGEN SATURATION: 99 % | BODY MASS INDEX: 29.85 KG/M2 | SYSTOLIC BLOOD PRESSURE: 138 MMHG | DIASTOLIC BLOOD PRESSURE: 84 MMHG | WEIGHT: 162.19 LBS | HEIGHT: 62 IN

## 2025-07-15 DIAGNOSIS — Z01.818 PREOP EXAMINATION: Primary | ICD-10-CM

## 2025-07-15 PROBLEM — H60.553 ACUTE REACTIVE OTITIS EXTERNA OF BOTH EARS: Status: RESOLVED | Noted: 2024-04-24 | Resolved: 2025-07-15

## 2025-07-15 PROCEDURE — 99213 OFFICE O/P EST LOW 20 MIN: CPT | Mod: ,,, | Performed by: STUDENT IN AN ORGANIZED HEALTH CARE EDUCATION/TRAINING PROGRAM

## 2025-07-15 NOTE — PROGRESS NOTES
"Pre-Operative Evaluation  Date: 07/15/2025  Patient ID: [id number]   Chief Complaint: Pre-op Exam (Surgical clearance for cataract left)    HPI:   Jacki Rutledge is a 65 y.o. female here today for a surgery preop evaluation. Plans to have left cataract repair . No other complaints today.  History of Present Illness    Ms. Rutledge presents today for follow up after right eye surgery. She reports successful right eye surgery with significant improvement in vision. She describes her previous vision as being "cloudy" which has now resolved to clear vision. She only requires reading glasses for near vision. The surgical procedure was uncomplicated. She has a history of elevated blood pressure, with an initial manual measurement of 170 systolic. A subsequent reading was lower, suggesting possible white coat hypertension. She reports chronic bilateral earwax buildup requiring medical intervention for removal. She denies chest pain or breathing difficulties. Gastrointestinal and genitourinary systems are functioning normally.          Problem List[1]  Outpatient Medications Marked as Taking for the 7/15/25 encounter (Office Visit) with Darlene Vazquez MD   Medication Sig Dispense Refill    busPIRone (BUSPAR) 5 MG Tab Take 1 tablet (5 mg total) by mouth every morning. 90 tablet 3    hydrOXYzine pamoate (VISTARIL) 25 MG Cap Take 1 capsule (25 mg total) by mouth every 8 (eight) hours as needed (anxiety). 30 capsule 11    lisinopriL (PRINIVIL,ZESTRIL) 20 MG tablet Take 1 tablet (20 mg total) by mouth once daily. 30 tablet 3    mesalamine (DELZICOL) 400 mg cdti cdti capsule Take by mouth.      pantoprazole (PROTONIX) 40 MG tablet Take 40 mg by mouth.       Past Medical History:   Diagnosis Date    Acute reactive otitis externa of both ears 04/24/2024    Hyperkalemia 03/03/2023    Decrease potassium intake in diet and adequate hydration recommended with 1 week course of Lasix as potassium wasting diuretic monitor for any signs or " "symptoms concerning for symptomatic hyperkalemia.  Discussed possibility of lab error as well however we will treat empirically and consider retesting at earlier date if concerning symptoms arise.       Past Surgical History:   Procedure Laterality Date    CATARACT EXTRACTION Right 06/24/2025    COLONOSCOPY  09/09/2020    HARSH WAITE MD     Review of patient's allergies indicates:  No Known Allergies  Family History   Problem Relation Name Age of Onset    Cancer Mother Karissa Khan     Heart disease Father Samreen Khan     Cancer Sister Deisy Khan       Social History[2]  Patient Care Team:  Darlene Vazquez MD as PCP - General (Family Medicine)  Dion Cisse MD as Consulting Physician (Cardiology)   Subjective:   Review of Systems   Gastrointestinal:  Negative for constipation.   Genitourinary:  Negative for dysuria.   Eyes: +vision changes  Cardiovascular: -chest pain  Respiratory: -shortness of breath       See HPI for details  All Other ROS: Negative except as stated in HPI  Objective:   /84   Pulse 79   Ht 5' 2" (1.575 m)   Wt 73.6 kg (162 lb 3.2 oz)   SpO2 99%   BMI 29.67 kg/m²   Physical Exam  Constitutional:       General: She is not in acute distress.  HENT:      Right Ear: External ear normal.      Left Ear: Tympanic membrane, ear canal and external ear normal. There is no impacted cerumen.      Ears:      Comments: Some wax in R canal     Nose: No congestion or rhinorrhea.      Mouth/Throat:      Pharynx: No oropharyngeal exudate or posterior oropharyngeal erythema.   Eyes:      Extraocular Movements: Extraocular movements intact.   Cardiovascular:      Rate and Rhythm: Regular rhythm.      Heart sounds: Normal heart sounds. No murmur heard.  Pulmonary:      Effort: Pulmonary effort is normal.      Breath sounds: No wheezing, rhonchi or rales.   Abdominal:      General: Bowel sounds are normal.      Palpations: Abdomen is soft.      Tenderness: There is no abdominal " tenderness. There is no guarding or rebound.   Musculoskeletal:      Comments: Ambulates independently   Skin:     General: Skin is warm.   Neurological:      Mental Status: She is alert and oriented to person, place, and time.   Psychiatric:         Mood and Affect: Mood normal.       Assessment:       ICD-10-CM ICD-9-CM   1. Preop examination  Z01.818 V72.84      Plan:   1. Preop examination  Assessment & Plan:  RCRI: Class 1 Risk 3.9 % 30-day risk of death, MI, or cardiac arrest  Pending EKG results  Hold vitK antagonist 5-7 days prior to procedure as applicable. Might need bridging if INR not at goal, valvular Afib, etc.   Hold DOACs 1-2d prior to surgery and start 2nd day after surgery  Hold GLP-1 agonists within 5-7 days prior to procedure as applicable  Discussed risk of cardiovacular event, death, etc surrounding procedure with patient, who vocalized understanding  Necessary documents completed              Medication List with Changes/Refills   Current Medications    BUSPIRONE (BUSPAR) 5 MG TAB    Take 1 tablet (5 mg total) by mouth every morning.       Start Date: 1/8/2025  End Date: --    HYDROXYZINE PAMOATE (VISTARIL) 25 MG CAP    Take 1 capsule (25 mg total) by mouth every 8 (eight) hours as needed (anxiety).       Start Date: 4/24/2024 End Date: --    LISINOPRIL (PRINIVIL,ZESTRIL) 20 MG TABLET    Take 1 tablet (20 mg total) by mouth once daily.       Start Date: 6/4/2025  End Date: --    MESALAMINE (DELZICOL) 400 MG CDTI CDTI CAPSULE    Take by mouth.       Start Date: 10/27/2022End Date: --    METOPROLOL SUCCINATE (TOPROL-XL) 100 MG 24 HR TABLET    Take 1 tablet (100 mg total) by mouth once daily.       Start Date: 12/12/2023End Date: --    NEOMYCIN-POLYMYXIN-HYDROCORTISONE (CORTISPORIN) 3.5-10,000-1 MG/ML-UNIT/ML-% OTIC SUSPENSION    Place 3 drops into the left ear 2 (two) times a day.       Start Date: 4/24/2024 End Date: --    PANTOPRAZOLE (PROTONIX) 40 MG TABLET    Take 40 mg by mouth.       Start  Date: 2/7/2023  End Date: --    VALACYCLOVIR (VALTREX) 1000 MG TABLET    Take 1 tablet (1,000 mg total) by mouth 3 (three) times daily. for 7 days       Start Date: 4/10/2024 End Date: 4/17/2024        Follow up if symptoms worsen or fail to improve. In addition to their scheduled follow up, the patient has also been instructed to follow up on as needed basis.     This note was created using Net[employer] voice recognition software that occasionally misinterpreted phrases or words. Please contact me if any questions may rise regarding documentation to clarify verbiage.    This note was generated with the assistance of ambient listening technology. Verbal consent was obtained by the patient and accompanying visitor(s) for the recording of patient appointment to facilitate this note. I attest to having reviewed and edited the generated note for accuracy, though some syntax or spelling errors may persist. Please contact the author of this note for any clarification.           [1]   Patient Active Problem List  Diagnosis    Crohn's disease without complication, unspecified gastrointestinal tract location    Herpes zoster without complication    Benign essential HTN    Anxiety    Cigarette smoker    Tonsillar mass    Vitamin D deficiency    Preop examination   [2]   Social History  Socioeconomic History    Marital status:    Tobacco Use    Smoking status: Former     Current packs/day: 0.50     Types: Cigarettes    Smokeless tobacco: Never   Substance and Sexual Activity    Alcohol use: Yes     Alcohol/week: 2.0 standard drinks of alcohol     Types: 2 Glasses of wine per week    Drug use: Never    Sexual activity: Not Currently     Partners: Male     Birth control/protection: Abstinence     Social Drivers of Health     Financial Resource Strain: Low Risk  (5/31/2025)    Overall Financial Resource Strain (CARDIA)     Difficulty of Paying Living Expenses: Not hard at all   Food Insecurity: No Food Insecurity (5/31/2025)     Hunger Vital Sign     Worried About Running Out of Food in the Last Year: Never true     Ran Out of Food in the Last Year: Never true   Transportation Needs: No Transportation Needs (5/31/2025)    PRAPARE - Transportation     Lack of Transportation (Medical): No     Lack of Transportation (Non-Medical): No   Physical Activity: Insufficiently Active (5/31/2025)    Exercise Vital Sign     Days of Exercise per Week: 2 days     Minutes of Exercise per Session: 20 min   Stress: No Stress Concern Present (5/31/2025)    [employer] - Occupational Stress Questionnaire     Feeling of Stress : Only a little   Housing Stability: Low Risk  (5/31/2025)    Housing Stability Vital Sign     Unable to Pay for Housing in the Last Year: No     Number of Times Moved in the Last Year: 0     Homeless in the Last Year: No

## 2025-07-15 NOTE — ASSESSMENT & PLAN NOTE
RCRI: Class 1 Risk 3.9 % 30-day risk of death, MI, or cardiac arrest  Pending EKG results  Hold vitK antagonist 5-7 days prior to procedure as applicable. Might need bridging if INR not at goal, valvular Afib, etc.   Hold DOACs 1-2d prior to surgery and start 2nd day after surgery  Hold GLP-1 agonists within 5-7 days prior to procedure as applicable  Discussed risk of cardiovacular event, death, etc surrounding procedure with patient, who vocalized understanding  Necessary documents completed